# Patient Record
Sex: MALE
[De-identification: names, ages, dates, MRNs, and addresses within clinical notes are randomized per-mention and may not be internally consistent; named-entity substitution may affect disease eponyms.]

---

## 2022-12-01 ENCOUNTER — NURSE TRIAGE (OUTPATIENT)
Dept: OTHER | Facility: CLINIC | Age: 57
End: 2022-12-01

## 2022-12-01 NOTE — TELEPHONE ENCOUNTER
Location of patient: [place]     Subjective: Caller states \"All week he's breathing worse even with breathing treatments. He says he feels like he's suffocating. It's worse at night. \"     Current Symptoms: cough, difficulty breathing is causing difficulty sleeping, wheezing, shallow breaths, lightheaded when coughing     Onset: 10/27/22     Associated Symptoms: NA    Pain Severity: NA    Temperature: denies     What has been tried: steroid pack, breathing treatments    LMP: NA Pregnant: NA    Recommended disposition: Go to ED Now    Care advice provided, patient verbalizes understanding; denies any other questions or concerns; instructed to call back for any new or worsening symptoms. This triage is a result of a call to [facility]. Please do not respond to the triage nurse through this encounter. Any subsequent communication should be directly with the patient.       Reason for Disposition   Oxygen level (e.g., pulse oximetry) 90 percent or lower    Protocols used: Breathing Difficulty-ADULT-